# Patient Record
Sex: MALE | Race: WHITE | NOT HISPANIC OR LATINO | URBAN - METROPOLITAN AREA
[De-identification: names, ages, dates, MRNs, and addresses within clinical notes are randomized per-mention and may not be internally consistent; named-entity substitution may affect disease eponyms.]

---

## 2022-03-10 ENCOUNTER — INPATIENT (INPATIENT)
Facility: HOSPITAL | Age: 62
LOS: 0 days | Discharge: HOME | DRG: 310 | End: 2022-03-11
Attending: HOSPITALIST | Admitting: FAMILY MEDICINE
Payer: COMMERCIAL

## 2022-03-10 VITALS
HEIGHT: 70 IN | TEMPERATURE: 99 F | DIASTOLIC BLOOD PRESSURE: 94 MMHG | SYSTOLIC BLOOD PRESSURE: 146 MMHG | RESPIRATION RATE: 18 BRPM | HEART RATE: 164 BPM | WEIGHT: 220.9 LBS | OXYGEN SATURATION: 98 %

## 2022-03-10 DIAGNOSIS — I48.91 UNSPECIFIED ATRIAL FIBRILLATION: ICD-10-CM

## 2022-03-10 LAB
ALBUMIN SERPL ELPH-MCNC: 4.1 G/DL — SIGNIFICANT CHANGE UP (ref 3.3–5.2)
ALP SERPL-CCNC: 96 U/L — SIGNIFICANT CHANGE UP (ref 40–120)
ALT FLD-CCNC: 19 U/L — SIGNIFICANT CHANGE UP
ANION GAP SERPL CALC-SCNC: 20 MMOL/L — HIGH (ref 5–17)
APTT BLD: 27.1 SEC — LOW (ref 27.5–35.5)
AST SERPL-CCNC: 41 U/L — HIGH
BASOPHILS # BLD AUTO: 0.09 K/UL — SIGNIFICANT CHANGE UP (ref 0–0.2)
BASOPHILS NFR BLD AUTO: 1 % — SIGNIFICANT CHANGE UP (ref 0–2)
BILIRUB SERPL-MCNC: 0.9 MG/DL — SIGNIFICANT CHANGE UP (ref 0.4–2)
BUN SERPL-MCNC: 9 MG/DL — SIGNIFICANT CHANGE UP (ref 8–20)
CALCIUM SERPL-MCNC: 9 MG/DL — SIGNIFICANT CHANGE UP (ref 8.6–10.2)
CHLORIDE SERPL-SCNC: 97 MMOL/L — LOW (ref 98–107)
CO2 SERPL-SCNC: 25 MMOL/L — SIGNIFICANT CHANGE UP (ref 22–29)
CREAT SERPL-MCNC: 0.62 MG/DL — SIGNIFICANT CHANGE UP (ref 0.5–1.3)
EGFR: 109 ML/MIN/1.73M2 — SIGNIFICANT CHANGE UP
EOSINOPHIL # BLD AUTO: 0.02 K/UL — SIGNIFICANT CHANGE UP (ref 0–0.5)
EOSINOPHIL NFR BLD AUTO: 0.2 % — SIGNIFICANT CHANGE UP (ref 0–6)
FLUAV AG NPH QL: SIGNIFICANT CHANGE UP
FLUBV AG NPH QL: SIGNIFICANT CHANGE UP
GLUCOSE SERPL-MCNC: 140 MG/DL — HIGH (ref 70–99)
HCT VFR BLD CALC: 40 % — SIGNIFICANT CHANGE UP (ref 39–50)
HGB BLD-MCNC: 14.2 G/DL — SIGNIFICANT CHANGE UP (ref 13–17)
IMM GRANULOCYTES NFR BLD AUTO: 0.3 % — SIGNIFICANT CHANGE UP (ref 0–1.5)
INR BLD: 1.02 RATIO — SIGNIFICANT CHANGE UP (ref 0.88–1.16)
LYMPHOCYTES # BLD AUTO: 1.47 K/UL — SIGNIFICANT CHANGE UP (ref 1–3.3)
LYMPHOCYTES # BLD AUTO: 17.1 % — SIGNIFICANT CHANGE UP (ref 13–44)
MAGNESIUM SERPL-MCNC: 1.6 MG/DL — LOW (ref 1.8–2.6)
MCHC RBC-ENTMCNC: 35.5 GM/DL — SIGNIFICANT CHANGE UP (ref 32–36)
MCHC RBC-ENTMCNC: 36 PG — HIGH (ref 27–34)
MCV RBC AUTO: 101.5 FL — HIGH (ref 80–100)
MONOCYTES # BLD AUTO: 0.72 K/UL — SIGNIFICANT CHANGE UP (ref 0–0.9)
MONOCYTES NFR BLD AUTO: 8.4 % — SIGNIFICANT CHANGE UP (ref 2–14)
NEUTROPHILS # BLD AUTO: 6.29 K/UL — SIGNIFICANT CHANGE UP (ref 1.8–7.4)
NEUTROPHILS NFR BLD AUTO: 73 % — SIGNIFICANT CHANGE UP (ref 43–77)
NT-PROBNP SERPL-SCNC: 518 PG/ML — HIGH (ref 0–300)
PHOSPHATE SERPL-MCNC: 1.7 MG/DL — LOW (ref 2.4–4.7)
PLATELET # BLD AUTO: 240 K/UL — SIGNIFICANT CHANGE UP (ref 150–400)
POTASSIUM SERPL-MCNC: 2.8 MMOL/L — CRITICAL LOW (ref 3.5–5.3)
POTASSIUM SERPL-SCNC: 2.8 MMOL/L — CRITICAL LOW (ref 3.5–5.3)
PROT SERPL-MCNC: 6.8 G/DL — SIGNIFICANT CHANGE UP (ref 6.6–8.7)
PROTHROM AB SERPL-ACNC: 11.8 SEC — SIGNIFICANT CHANGE UP (ref 10.5–13.4)
RBC # BLD: 3.94 M/UL — LOW (ref 4.2–5.8)
RBC # FLD: 15.2 % — HIGH (ref 10.3–14.5)
RSV RNA NPH QL NAA+NON-PROBE: SIGNIFICANT CHANGE UP
SARS-COV-2 RNA SPEC QL NAA+PROBE: SIGNIFICANT CHANGE UP
SODIUM SERPL-SCNC: 142 MMOL/L — SIGNIFICANT CHANGE UP (ref 135–145)
TROPONIN T SERPL-MCNC: <0.01 NG/ML — SIGNIFICANT CHANGE UP (ref 0–0.06)
WBC # BLD: 8.62 K/UL — SIGNIFICANT CHANGE UP (ref 3.8–10.5)
WBC # FLD AUTO: 8.62 K/UL — SIGNIFICANT CHANGE UP (ref 3.8–10.5)

## 2022-03-10 PROCEDURE — 99291 CRITICAL CARE FIRST HOUR: CPT

## 2022-03-10 PROCEDURE — 99223 1ST HOSP IP/OBS HIGH 75: CPT

## 2022-03-10 PROCEDURE — 73130 X-RAY EXAM OF HAND: CPT | Mod: 26,LT

## 2022-03-10 PROCEDURE — 93306 TTE W/DOPPLER COMPLETE: CPT | Mod: 26

## 2022-03-10 PROCEDURE — 93010 ELECTROCARDIOGRAM REPORT: CPT

## 2022-03-10 PROCEDURE — 71045 X-RAY EXAM CHEST 1 VIEW: CPT | Mod: 26

## 2022-03-10 RX ORDER — SODIUM CHLORIDE 9 MG/ML
1000 INJECTION INTRAMUSCULAR; INTRAVENOUS; SUBCUTANEOUS ONCE
Refills: 0 | Status: COMPLETED | OUTPATIENT
Start: 2022-03-10 | End: 2022-03-10

## 2022-03-10 RX ORDER — ENOXAPARIN SODIUM 100 MG/ML
100 INJECTION SUBCUTANEOUS EVERY 12 HOURS
Refills: 0 | Status: DISCONTINUED | OUTPATIENT
Start: 2022-03-11 | End: 2022-03-11

## 2022-03-10 RX ORDER — ENOXAPARIN SODIUM 100 MG/ML
60 INJECTION SUBCUTANEOUS ONCE
Refills: 0 | Status: COMPLETED | OUTPATIENT
Start: 2022-03-10 | End: 2022-03-10

## 2022-03-10 RX ORDER — SODIUM,POTASSIUM PHOSPHATES 278-250MG
1 POWDER IN PACKET (EA) ORAL ONCE
Refills: 0 | Status: COMPLETED | OUTPATIENT
Start: 2022-03-10 | End: 2022-03-10

## 2022-03-10 RX ORDER — MIDAZOLAM HYDROCHLORIDE 1 MG/ML
2 INJECTION, SOLUTION INTRAMUSCULAR; INTRAVENOUS ONCE
Refills: 0 | Status: DISCONTINUED | OUTPATIENT
Start: 2022-03-10 | End: 2022-03-10

## 2022-03-10 RX ORDER — ENOXAPARIN SODIUM 100 MG/ML
40 INJECTION SUBCUTANEOUS ONCE
Refills: 0 | Status: COMPLETED | OUTPATIENT
Start: 2022-03-10 | End: 2022-03-10

## 2022-03-10 RX ORDER — METOPROLOL TARTRATE 50 MG
25 TABLET ORAL EVERY 8 HOURS
Refills: 0 | Status: DISCONTINUED | OUTPATIENT
Start: 2022-03-10 | End: 2022-03-11

## 2022-03-10 RX ORDER — DILTIAZEM HCL 120 MG
60 CAPSULE, EXT RELEASE 24 HR ORAL ONCE
Refills: 0 | Status: COMPLETED | OUTPATIENT
Start: 2022-03-10 | End: 2022-03-10

## 2022-03-10 RX ORDER — DILTIAZEM HCL 120 MG
10 CAPSULE, EXT RELEASE 24 HR ORAL ONCE
Refills: 0 | Status: COMPLETED | OUTPATIENT
Start: 2022-03-10 | End: 2022-03-10

## 2022-03-10 RX ORDER — DILTIAZEM HCL 120 MG
25 CAPSULE, EXT RELEASE 24 HR ORAL ONCE
Refills: 0 | Status: COMPLETED | OUTPATIENT
Start: 2022-03-10 | End: 2022-03-10

## 2022-03-10 RX ORDER — MAGNESIUM SULFATE 500 MG/ML
2 VIAL (ML) INJECTION ONCE
Refills: 0 | Status: COMPLETED | OUTPATIENT
Start: 2022-03-10 | End: 2022-03-10

## 2022-03-10 RX ORDER — POTASSIUM CHLORIDE 20 MEQ
20 PACKET (EA) ORAL ONCE
Refills: 0 | Status: COMPLETED | OUTPATIENT
Start: 2022-03-10 | End: 2022-03-10

## 2022-03-10 RX ORDER — POTASSIUM CHLORIDE 20 MEQ
10 PACKET (EA) ORAL
Refills: 0 | Status: COMPLETED | OUTPATIENT
Start: 2022-03-10 | End: 2022-03-10

## 2022-03-10 RX ADMIN — ENOXAPARIN SODIUM 60 MILLIGRAM(S): 100 INJECTION SUBCUTANEOUS at 17:00

## 2022-03-10 RX ADMIN — Medication 1 MILLIGRAM(S): at 22:24

## 2022-03-10 RX ADMIN — ENOXAPARIN SODIUM 40 MILLIGRAM(S): 100 INJECTION SUBCUTANEOUS at 16:04

## 2022-03-10 RX ADMIN — Medication 10 MILLIGRAM(S): at 16:02

## 2022-03-10 RX ADMIN — Medication 25 GRAM(S): at 17:42

## 2022-03-10 RX ADMIN — SODIUM CHLORIDE 1000 MILLILITER(S): 9 INJECTION INTRAMUSCULAR; INTRAVENOUS; SUBCUTANEOUS at 15:57

## 2022-03-10 RX ADMIN — SODIUM CHLORIDE 1000 MILLILITER(S): 9 INJECTION INTRAMUSCULAR; INTRAVENOUS; SUBCUTANEOUS at 17:25

## 2022-03-10 RX ADMIN — Medication 100 MILLIEQUIVALENT(S): at 22:09

## 2022-03-10 RX ADMIN — Medication 100 MILLIEQUIVALENT(S): at 18:24

## 2022-03-10 RX ADMIN — Medication 50 MILLIGRAM(S): at 22:07

## 2022-03-10 RX ADMIN — Medication 20 MILLIEQUIVALENT(S): at 17:00

## 2022-03-10 RX ADMIN — SODIUM CHLORIDE 1000 MILLILITER(S): 9 INJECTION INTRAMUSCULAR; INTRAVENOUS; SUBCUTANEOUS at 18:25

## 2022-03-10 RX ADMIN — Medication 25 MILLIGRAM(S): at 22:26

## 2022-03-10 RX ADMIN — Medication 25 MILLIGRAM(S): at 16:00

## 2022-03-10 RX ADMIN — Medication 60 MILLIGRAM(S): at 15:57

## 2022-03-10 RX ADMIN — SODIUM CHLORIDE 1000 MILLILITER(S): 9 INJECTION INTRAMUSCULAR; INTRAVENOUS; SUBCUTANEOUS at 18:15

## 2022-03-10 RX ADMIN — MIDAZOLAM HYDROCHLORIDE 2 MILLIGRAM(S): 1 INJECTION, SOLUTION INTRAMUSCULAR; INTRAVENOUS at 15:53

## 2022-03-10 RX ADMIN — Medication 1 PACKET(S): at 22:07

## 2022-03-10 NOTE — ED PROVIDER NOTE - CLINICAL SUMMARY MEDICAL DECISION MAKING FREE TEXT BOX
60 yo male presents with palpitations from physician's office. Found to be in AFIB with RVR. Will administer 25 mg Cardizem, Versed to quell the patient's anxiety, IVF. Cardiac workup. XR left hand. Monitor and reassess.

## 2022-03-10 NOTE — ED ADULT NURSE REASSESSMENT NOTE - NS ED NURSE REASSESS COMMENT FT1
new heplock inserted 20 gauge left hand patient transport to [facility] at this time

## 2022-03-10 NOTE — ED ADULT NURSE NOTE - CHIEF COMPLAINT QUOTE
pt sent by MD for AFib, states palpitations on & off most likely due to alcohol drinking written on papers with patient From Dr Skinner  A&Ox3, resp wnl, denies CP SOB at times, HX Afib -156

## 2022-03-10 NOTE — ED ADULT TRIAGE NOTE - CHIEF COMPLAINT QUOTE
pt sent by MD for AFib, states palpitations on & off  A&Ox3, resp wnl, denies CP SOB at times, HX Afib -156 pt sent by MD for AFib, states palpitations on & off most likely due to alcohol drinking written on papers with patient From Dr Skinner  A&Ox3, resp wnl, denies CP SOB at times, HX Afib -156

## 2022-03-10 NOTE — ED PROVIDER NOTE - PHYSICAL EXAMINATION
Gen: NAD  Head: NC/AT  Neck: trachea midline  Resp:  No distress, lungs cta b/l  Cardiac: Irregularly irregular rate and rhythm. No LE edema.   Ext: no deformities. Left hand ttp.  Neuro:  A&O appears non focal  Skin:  Warm and dry as visualized  Psych:  Anxious appearing.

## 2022-03-10 NOTE — ED ADULT NURSE REASSESSMENT NOTE - NS ED NURSE REASSESS COMMENT FT1
endorsed received from [place] doe patient presented for palpitations patient denies chest pain at this time cardiac monitor show hr 133 patient seen and evaluated by md

## 2022-03-10 NOTE — CONSULT NOTE ADULT - SUBJECTIVE AND OBJECTIVE BOX
[facility]                                                               Office:  [address] [address]                                                              Telephone: [phone]. Fax:[phone]                                                                        CARDIOLOGY CONSULTATION NOTE                                                                                             Consult requested by:  Dr. Agee   Reason for Consultation: Afib paroxysmal   History obtained by: Patient and medical record   obtained: No    Chief complaint:    Patient is a 61y old  Male who presents with a chief complaint of       HPI: 60 y/o with PMH of pAF (previously in [place] year ago was on AC and then transitioned to ASA) presented to the PMD with bilateral lower extremity and upper extremity arm pain and was noted to be in Afib and referred to the hospital. Patient notes that hie was told that his heart rate was elevated and so presented to the ER. He notes that the last time he was diagnosed with AF he was started on AC and had no cardioversions and then transitioned to Asa 81mg po q daily which he states he isn't taking at this time He denies nay palpitations dizziness, lightheadedness, and or syncope further denies any chest pain or shortness of breath at rest or upon exertion.   Patient complains of bilateral lower extremity pain and LUE numbness and pain.   No previous cardiac work up       REVIEW OF SYMPTOMS:     CONSTITUTIONAL: No fever, weight loss, or fatigue  ENMT:  No difficulty hearing, tinnitus, vertigo; No sinus or throat pain  NECK: No pain or stiffness  CARDIOVASCULAR: No chest pain, dyspnea, syncope, palpitations, dizziness, Orthopnea, Paroxsymal nocturnal dyspnea  RESPIRATORY: No Dyspnea on exertion, Shortness of breath, cough, wheezing  : No dysuria, no hematuria   GI: No dark color stool, no melena, no diarrhea, no constipation, no abdominal pain   NEURO: No headache, no dizziness, no slurred speech   MUSCULOSKELETAL: + joint pain; No muscle, back, or extremity pain  PSYCH: No agitation, no anxiety.    ALL OTHER REVIEW OF SYSTEMS ARE NEGATIVE.      PREVIOUS DIAGNOSTIC TESTING  ECHO FINDINGS:  Pending     STRESS FINDINGS:      CATHETERIZATION FINDINGS:         ALLERGIES: Allergies    No Known Allergies    Intolerances          PAST MEDICAL HISTORY  Afib        PAST SURGICAL HISTORY      FAMILY HISTORY:      SOCIAL HISTORY:  Denies smoking/alcohol/drugs  CIGARETTES:     ALCOHOL:  DRUGS:      CURRENT MEDICATIONS:           HOME MEDICATIONS:      Vital Signs Last 24 Hrs  T(C): 37 (10 Mar 2022 15:05), Max: 37 (10 Mar 2022 15:05)  T(F): 98.6 (10 Mar 2022 15:05), Max: 98.6 (10 Mar 2022 15:05)  HR: 103 (10 Mar 2022 16:08) (103 - 164)  BP: 102/72 (10 Mar 2022 16:08) (95/65 - 146/94)  BP(mean): --  RR: 16 (10 Mar 2022 16:08) (16 - 18)  SpO2: 99% (10 Mar 2022 16:08) (98% - 99%)      PHYSICAL EXAM:  Constitutional: Comfortable . No acute distress.   HEENT: Atraumatic and normocephalic , neck is supple . no JVD. No carotid bruit. PEERL   CNS: A&Ox3. No focal deficits. EOMI. Cranial nerves II-IX are intact.   Lymph Nodes: Cervical : Not palpable.  Respiratory: CTAB  Cardiovascular: S1S2 RRR. No murmur/rubs or gallop.  Gastrointestinal: Soft non-tender and non distended . +Bowel sounds. negative Gleason's sign.  Extremities: No edema. + 2 DP and PT palpated   Psychiatric: Calm . no agitation.  Skin: No skin rash/ulcers visualized to face, hands or feet.    Intake and output:     LABS:                        14.2   8.62  )-----------( 240      ( 10 Mar 2022 16:13 )             40.0             ;p-BNP=  PT/INR - ( 10 Mar 2022 16:13 )   PT: 11.8 sec;   INR: 1.02 ratio         PTT - ( 10 Mar 2022 16:13 )  PTT:27.1 sec      INTERPRETATION OF TELEMETRY: Reviewed by me. Afib with RVR   ECG: Reviewed by me. Afib with RVR @ 147 bpm     RADIOLOGY & ADDITIONAL STUDIES:    X-ray:  reviewed by me.   CT scan:   MRI:

## 2022-03-10 NOTE — H&P ADULT - NSHPSOCIALHISTORY_GEN_ALL_CORE
drinks 1/2 to 1 pint vodka daily, smokes 12-14 cig daily for past several years. has used cocaine many years ago as per pt.

## 2022-03-10 NOTE — ED PROVIDER NOTE - ATTENDING CONTRIBUTION TO CARE
I, Omi Burnett, performed a face to face bedside interview with this patient regarding history of present illness, review of symptoms and relevant past medical, social and family history.  I completed an independent physical examination. I have communicated the patient’s plan of care and disposition with the resident.  61 year old male presents with palpitations, found to have rapid afib, requiring immediate intervention with IV cardioactive medications for potentially life threatening etiology.  Gen: NAD, well appearing  CV: Rapid and irregular  Pul: CTA b/l  Abd: Soft, non-distended, non-tender  Neuro: no focal deficits  Pt admitted for rapid afib for cardioversion

## 2022-03-10 NOTE — H&P ADULT - NSHPPHYSICALEXAM_GEN_ALL_CORE
Vital Signs Last 24 Hrs  T(C): 37 (10 Mar 2022 15:05), Max: 37 (10 Mar 2022 15:05)  T(F): 98.6 (10 Mar 2022 15:05), Max: 98.6 (10 Mar 2022 15:05)  HR: 103 (10 Mar 2022 16:08) (103 - 164)  BP: 102/72 (10 Mar 2022 16:08) (95/65 - 146/94)  BP(mean): --  RR: 16 (10 Mar 2022 16:08) (16 - 18)  SpO2: 99% (10 Mar 2022 16:08) (98% - 99%)    General: pt. in bed not in distress.  HEENT: AT, NC. PERRL. intact EOM. no throat erythema or exudate.   Neck: supple. no JVD.   Chest: CTA bilaterally, no w /r/r.   Heart: S1,S2. IRRR. no heart murmur. no edema.   Abdomen: soft. non-tender. non-distended. + BS.   Ext: no calf tenderness, ROM of all ext. intact.   vascular : distal pulses 2 + b/L.  Neuro: AAO x3. no focal weakness. no speech disorder, cns ii to xii intact.  Skin: warm and dry, left thumb anteriorly with a small dry appearing cut, no signs of infection, no pallor. mild facial flush.  lymphatic system : no lymphadenopathy  psych : mildly anxious but no si/hi.

## 2022-03-10 NOTE — CONSULT NOTE ADULT - ASSESSMENT
62 y/o with PMH of pAF (previously in [place] year ago was on AC and then transitioned to ASA) presented to the PMD with bilateral lower extremity and upper extremity arm pain and was noted to be in Afib and referred to the hospital.     1) Paroxysmal Atrial fibrillation (CHADS-VASc: 0)   - patient denies any chest pain or shortness of breath and or palpitations   - Telemetry monitor reviewed and in Afib (s/p Cardizem)   - will start Lopressor 25mg po q 8hrs and continue with telemetry monitoring and blood pressure monitoring   - start Lovenox 100mg SQ Q12hrs   - check TSH Lipid Panel and Hgb A1C  - will refer to EP for possible ELISE/DCCV tomorrow   - check TTE to assess LV function and valvulopathy     2) Electrolyte abnormalities hypokalemia and hypomagnesemia   - replete electrolytes and recheck     Ayana Mott D.O. [facility]  Cardiology/Vascular Cardiology -[facility]   Telephone # [phone]

## 2022-03-10 NOTE — ED PROVIDER NOTE - OBJECTIVE STATEMENT
62 yo male with possible PMHx of AFIB presents with palpitations, in AFIB from PCP office. Patient states that he has a remote history of AFIB back when he lived in the [facility] [place] [facility]-he has not taken anti-HTN medications or rate control medications in "years". Patient does not take any blood thinners, he takes Aspirin daily and this is the only medication he takes. He states that he is not experiencing any palpitations, cp, sob, dizziness. Denies any recent fever/chills, cough, sore throat, abd pain, n/v/d, urinary complaints, focal weakness/numbness/tingling in extremities. 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

## 2022-03-10 NOTE — H&P ADULT - ASSESSMENT
pt. is a 60 y/o male with PMH of pAF (previously in [place] year ago was on coumadin and then transitioned to ASA which he does not take regularly but took one today ) presented to the PMD with bilateral lower extremity and upper extremity arm pain and tingling which is chronic ( used to be on gabapentin  but not using anymore ) and was noted to be in Afib and referred to the hospital. pt. stated that last time he was diagnosed with AF he was started on AC and had no cardioversions. He denies any cp, sob, palpitations dizziness, lightheadedness,  syncope or near syncope. no abd. pain, n/v/d reported. reported left thumb had some cut but that has dried out. no fever. no chills.  pt. admits to drinking about 1/2 to 1 pint of vodka daily, last use was yesterday. as per pt. he is not on any prescribed meds at this point.    - new afib with rvr. admit to tele, serial trop, echo, po lopressor, trop negative x 1, tsh normal range, full dose lovenox, potential cardioversion in am as per EP.     - hypokalemia, k replaced follow repeat.    - hypomagnesemia, mag replaced as well.     - etoh abuse, pt needs to cut down and then avoid etoh use, etoh use might have contributed to afib triger. will keep on ciwa protocol.     - tobacco abuse. smoking cessation, pt. will let us know if decides to use nicotine patch.

## 2022-03-10 NOTE — H&P ADULT - NSHPLANGLIMITEDENGLISH_GEN_A_CORE
Airway    Date/Time: 4/2/2021 11:40 AM  Performed by: Mele Kemp CRNA  Authorized by: Harry Mejía MD     Final Airway Type:  Endotracheal airway  Final Endotracheal Airway*:  ETT  ETT Size (mm)*:  7.5  Cuff*:  Regular  Technique Used for Successful ETT Placement:  Direct laryngoscopy  Devices/Methods Used in Placement*:  Mask, Bag Valve, Oral ETT and Stylet  Intubation Procedure*:  Preoxygenation, ETCO2, Atraumatic, Dentition Unchanged, Pharynx Clear and Rapid Sequence Intubation  Insertion Site:  Oral  Blade Type*:  MAC  Blade Size*:  3  Cuff Volume (mL):  7  Measured from*:  Lips  Secured at (cm)*:  22  Placement Verified by: auscultation, capnometry and equal breath sounds    Glottic View*:  1 - full view of glottis  Attempts*:  1  Ventilation Between Attempts:  None  Number of Other Approaches Attempted:  0   Patient Identified, Procedure confirmed, Emergency equipment available and Safety protocols followed  Location:  OR  Urgency:  Elective  Difficult Airway: No    Indications for Airway Management:  Anesthesia and airway protection  Spontaneous Ventilation: absent    Sedation Level:  Anesthetized  MILS Maintained Throughout: No    Mask Difficulty Assessment:  0 - not attempted  Performed By:  CRNA  CRNA:  Mele Kemp CRNA  Start Time: 4/2/2021 11:40 AM         No

## 2022-03-10 NOTE — CONSULT NOTE ADULT - SUBJECTIVE AND OBJECTIVE BOX
61 year old male patient, current smoker, with a known history of severe anxiety, depression, arthritis, chronic back pain, ETOH use, hx of cocaine use, and atrial fibrillation which was diagnosed around 4 years ago when he resided in the [facility]. He suffers from severe anxiety and self treats himself with alcohol. He works currently lifting heavy barrels and has noticed over the last 6 months increasing episodes of fatigue and malaise. He reports the onset of this has been rather indolent and does admit to palpitations on occasion with severe anxiety events. He admits to neuropathy of both his feet and left arm which he thinks is due to sciatica and overuse. He went to a new PMD (Dr. Skinner). When he performed an EKG he was found to be in rapid AFib and was referred to the ED    He currently denies any associated chest pain, or palpitations. Denies and recent fever or chills. No changes in weight.     PAST MEDICAL & SURGICAL HISTORY:  Afib    REVIEW OF SYSTEMS  General: - fever of chills, + fatigue  Skin/Breast: - rashes  Ophthalmologic: - blurred vision  ENMT: - sore throat  Respiratory and Thorax: - cough or dyspnea  Cardiovascular: + KUMAR, - chest pain or palpitations  Gastrointestinal:	- N/V/D/C  Genitourinary: - dysuria  Musculoskeletal:	 + back pain with arthritis  Neurological: +weaknesses bilateral legs  Psychiatric: +anxiety and depression     MEDICATIONS  (STANDING):  magnesium sulfate  IVPB 2 Gram(s) IV Intermittent Once  metoprolol tartrate 25 milliGRAM(s) Oral every 8 hours  potassium chloride  10 mEq/100 mL IVPB 10 milliEquivalent(s) IV Intermittent every 1 hour    MEDICATIONS  (PRN):    Allergies  No Known Allergies    SOCIAL HISTORY: current smoker. 1ppd x 20 years. Current drinker 1-2 pints Vodka daily for "years" Former cocaine user for many years but stopped some time ago    FAMILY HISTORY: No family hx of arrhythmias or CAD.     Vital Signs Last 24 Hrs  T(C): 37 (10 Mar 2022 15:05), Max: 37 (10 Mar 2022 15:05)  T(F): 98.6 (10 Mar 2022 15:05), Max: 98.6 (10 Mar 2022 15:05)  HR: 103 (10 Mar 2022 16:08) (103 - 164)  BP: 102/72 (10 Mar 2022 16:08) (95/65 - 146/94)  RR: 16 (10 Mar 2022 16:08) (16 - 18)  SpO2: 99% (10 Mar 2022 16:08) (98% - 99%)    Physical Exam:  Constitutional: AAOx3, NAD  Neck: supple, No JVD  Cardiovascular: +S1S2 IRIR, AFib at time of exam  Pulmonary: CTA b/l, unlabored, no wheezes, rales. rhonci  Abdomen: +BS, soft NTND  Extremities: no edema b/l,   Neuro: non focal, speech clear, MONDRAGON x 4    LABS:                        14.2   8.62  )-----------( 240      ( 10 Mar 2022 16:13 )             40.0     142  |  97<L>  |  9.0  ----------------------------<  140<H>  2.8<LL>   |  25.0  |  0.62  Ca    9.0      10 Mar 2022 16:13  Phos  1.7     03-10  Mg     1.6     03-10  TPro  6.8  /  Alb  4.1  /  TBili  0.9  /  DBili  x   /  AST  41<H>  /  ALT  19  /  AlkPhos  96  03-10  LIVER FUNCTIONS - ( 10 Mar 2022 16:13 )  Alb: 4.1 g/dL / Pro: 6.8 g/dL / ALK PHOS: 96 U/L / ALT: 19 U/L / AST: 41 U/L / GGT: x         PT/INR - ( 10 Mar 2022 16:13 )   PT: 11.8 sec;   INR: 1.02 ratio    PTT - ( 10 Mar 2022 16:13 )  PTT:27.1 secCARDIAC MARKERS ( 10 Mar 2022 16:13 )  x     / <0.01 ng/mL / x     / x     / x        RADIOLOGY & ADDITIONAL STUDIES:  CXR 3/10/22  COMPARISON: None available.  Heart is enlarged. Lungs are clear.  IMPRESSION: No acute finding. Heart enlargement.    A/P   61 year old male patient, current smoker, with a known history of severe anxiety, depression, arthritis, chronic back pain, ETOH use, hx of cocaine use, and atrial fibrillation which was diagnosed around 4 years ago when he resided in the [facility]. He suffers from severe anxiety and self treats himself with alcohol. He works currently lifting heavy barrels and has noticed over the last 6 months increasing episodes of fatigue and malaise. He reports the onset of this has been rather indolent and does admit to palpitations on occasion with severe anxiety events. He admits to neuropathy of both his feet and left arm which he thinks is due to sciatica and overuse. He went to a new PMD (Dr. Skinner). When he performed an EKG he was found to be in rapid AFib and was referred to the ED    He currently denies any associated chest pain, or palpitations. Denies and recent fever or chills. No changes in weight.     PAST MEDICAL & SURGICAL HISTORY:  Afib    REVIEW OF SYSTEMS  General: - fever of chills, + fatigue  Skin/Breast: - rashes  Ophthalmologic: - blurred vision  ENMT: - sore throat  Respiratory and Thorax: - cough or dyspnea  Cardiovascular: + KUMAR, - chest pain or palpitations  Gastrointestinal:	- N/V/D/C  Genitourinary: - dysuria  Musculoskeletal:	 + back pain with arthritis  Neurological: +weaknesses bilateral legs  Psychiatric: +anxiety and depression     MEDICATIONS  (STANDING):  magnesium sulfate  IVPB 2 Gram(s) IV Intermittent Once  metoprolol tartrate 25 milliGRAM(s) Oral every 8 hours  potassium chloride  10 mEq/100 mL IVPB 10 milliEquivalent(s) IV Intermittent every 1 hour    MEDICATIONS  (PRN):    Allergies  No Known Allergies    SOCIAL HISTORY: current smoker. 1ppd x 20 years. Current drinker 1-2 pints Vodka daily for "years" Former cocaine user for many years but stopped some time ago    FAMILY HISTORY: No family hx of arrhythmias or CAD.     Vital Signs Last 24 Hrs  T(C): 37 (10 Mar 2022 15:05), Max: 37 (10 Mar 2022 15:05)  T(F): 98.6 (10 Mar 2022 15:05), Max: 98.6 (10 Mar 2022 15:05)  HR: 103 (10 Mar 2022 16:08) (103 - 164)  BP: 102/72 (10 Mar 2022 16:08) (95/65 - 146/94)  RR: 16 (10 Mar 2022 16:08) (16 - 18)  SpO2: 99% (10 Mar 2022 16:08) (98% - 99%)    Physical Exam:  Constitutional: AAOx3, NAD  Neck: supple, No JVD  Cardiovascular: +S1S2 IRIR, AFib at time of exam  Pulmonary: CTA b/l, unlabored, no wheezes, rales. rhonci  Abdomen: +BS, soft NTND  Extremities: no edema b/l,   Neuro: non focal, speech clear, OMNDRAGON x 4    LABS:                        14.2   8.62  )-----------( 240      ( 10 Mar 2022 16:13 )             40.0     142  |  97<L>  |  9.0  ----------------------------<  140<H>  2.8<LL>   |  25.0  |  0.62  Ca    9.0      10 Mar 2022 16:13  Phos  1.7     03-10  Mg     1.6     03-10  TPro  6.8  /  Alb  4.1  /  TBili  0.9  /  DBili  x   /  AST  41<H>  /  ALT  19  /  AlkPhos  96  03-10  LIVER FUNCTIONS - ( 10 Mar 2022 16:13 )  Alb: 4.1 g/dL / Pro: 6.8 g/dL / ALK PHOS: 96 U/L / ALT: 19 U/L / AST: 41 U/L / GGT: x         PT/INR - ( 10 Mar 2022 16:13 )   PT: 11.8 sec;   INR: 1.02 ratio    PTT - ( 10 Mar 2022 16:13 )  PTT:27.1 secCARDIAC MARKERS ( 10 Mar 2022 16:13 )  x     / <0.01 ng/mL / x     / x     / x        RADIOLOGY & ADDITIONAL STUDIES:  CXR 3/10/22  COMPARISON: None available.  Heart is enlarged. Lungs are clear.  IMPRESSION: No acute finding. Heart enlargement.    EKG: Afib at 147bpm; QRSD 88ms    A/P  61 year old male patient, current smoker, with a known history of severe anxiety, depression, arthritis, chronic back pain, ETOH use, hx of cocaine use who presents with rapid atrial fibrillation. CHADSVASC: 0     - TSH normal   - TTE  - CIWA protocol   - Cardiology consult appreciated  - Rate control with beta blockers for now - unclear if he has CMP  - Remain NPO for potential ELISE/DCCV tomorrow  - Will need EP follow up as outpatient to discuss future rhythm control strategies.    61 year old male patient, current smoker, with a known history of severe anxiety, depression, arthritis, chronic back pain, ETOH use, hx of cocaine use, and atrial fibrillation which was diagnosed around 4 years ago when he resided in the [facility]. He suffers from severe anxiety and self treats himself with alcohol. He works currently lifting heavy barrels and has noticed over the last 6 months increasing episodes of fatigue and malaise. He reports the onset of this has been rather indolent and does admit to palpitations on occasion with severe anxiety events. He admits to neuropathy of both his feet and left arm which he thinks is due to sciatica and overuse. He went to a new PMD (Dr. Skinner). When he performed an EKG he was found to be in rapid AFib and was referred to the ED    He currently denies any associated chest pain, or palpitations. Denies and recent fever or chills. No changes in weight.     PAST MEDICAL & SURGICAL HISTORY:  Afib    REVIEW OF SYSTEMS  General: - fever of chills, + fatigue  Skin/Breast: - rashes  Ophthalmologic: - blurred vision  ENMT: - sore throat  Respiratory and Thorax: - cough or dyspnea  Cardiovascular: + KUMAR, - chest pain or palpitations  Gastrointestinal:	- N/V/D/C  Genitourinary: - dysuria  Musculoskeletal:	 + back pain with arthritis  Neurological: +weaknesses bilateral legs  Psychiatric: +anxiety and depression     MEDICATIONS  (STANDING):  magnesium sulfate  IVPB 2 Gram(s) IV Intermittent Once  metoprolol tartrate 25 milliGRAM(s) Oral every 8 hours  potassium chloride  10 mEq/100 mL IVPB 10 milliEquivalent(s) IV Intermittent every 1 hour    MEDICATIONS  (PRN):    Allergies  No Known Allergies    SOCIAL HISTORY: current smoker. 1ppd x 20 years. Current drinker 1-2 pints Vodka daily for "years" Former cocaine user for many years but stopped some time ago    FAMILY HISTORY: No family hx of arrhythmias or CAD.     Vital Signs Last 24 Hrs  T(C): 37 (10 Mar 2022 15:05), Max: 37 (10 Mar 2022 15:05)  T(F): 98.6 (10 Mar 2022 15:05), Max: 98.6 (10 Mar 2022 15:05)  HR: 103 (10 Mar 2022 16:08) (103 - 164)  BP: 102/72 (10 Mar 2022 16:08) (95/65 - 146/94)  RR: 16 (10 Mar 2022 16:08) (16 - 18)  SpO2: 99% (10 Mar 2022 16:08) (98% - 99%)    Physical Exam:  Constitutional: AAOx3, NAD  Neck: supple, No JVD  Cardiovascular: +S1S2 IRIR, AFib at time of exam  Pulmonary: CTA b/l, unlabored, no wheezes, rales. rhonci  Abdomen: +BS, soft NTND  Extremities: no edema b/l,   Neuro: non focal, speech clear, MONDRAGON x 4    LABS:                        14.2   8.62  )-----------( 240      ( 10 Mar 2022 16:13 )             40.0     142  |  97<L>  |  9.0  ----------------------------<  140<H>  2.8<LL>   |  25.0  |  0.62  Ca    9.0      10 Mar 2022 16:13  Phos  1.7     03-10  Mg     1.6     03-10  TPro  6.8  /  Alb  4.1  /  TBili  0.9  /  DBili  x   /  AST  41<H>  /  ALT  19  /  AlkPhos  96  03-10  LIVER FUNCTIONS - ( 10 Mar 2022 16:13 )  Alb: 4.1 g/dL / Pro: 6.8 g/dL / ALK PHOS: 96 U/L / ALT: 19 U/L / AST: 41 U/L / GGT: x         PT/INR - ( 10 Mar 2022 16:13 )   PT: 11.8 sec;   INR: 1.02 ratio    PTT - ( 10 Mar 2022 16:13 )  PTT:27.1 secCARDIAC MARKERS ( 10 Mar 2022 16:13 )  x     / <0.01 ng/mL / x     / x     / x        RADIOLOGY & ADDITIONAL STUDIES:  CXR 3/10/22  COMPARISON: None available.  Heart is enlarged. Lungs are clear.  IMPRESSION: No acute finding. Heart enlargement.    EKG: Afib at 147bpm; QRSD 88ms    A/P  61 year old male patient, current smoker, with a known history of severe anxiety, depression, arthritis, chronic back pain, ETOH use, hx of cocaine use who presents with rapid atrial fibrillation. CHADSVASC: 0     - TSH normal   - TTE  - CIWA protocol   - Cardiology consult appreciated  - Rate control with beta blockers for now - unclear if he has CMP  - Anticoagulate with Lovenox 100mg BID  - Remain NPO for potential ELISE/DCCV tomorrow  - Will need EP follow up as outpatient to discuss future rhythm control strategies.

## 2022-03-10 NOTE — H&P ADULT - HISTORY OF PRESENT ILLNESS
pt. is a 62 y/o male with PMH of pAF (previously in [place] year ago was on coumadin and then transitioned to ASA which he does not take regularly but took one today ) presented to the PMD with bilateral lower extremity and upper extremity arm pain and tingling which is chronic ( used to be on gabapentin  but not using anymore ) and was noted to be in Afib and referred to the hospital. pt. stated that last time he was diagnosed with AF he was started on AC and had no cardioversions. He denies any cp, sob, palpitations dizziness, lightheadedness,  syncope or near syncope. no abd. pain, n/v/d reported. pt. admits to drinking about 1/2 to 1 pint of vodka daily, last use was yesterday.  pt. is a 62 y/o male with PMH of pAF (previously in [place] year ago was on coumadin and then transitioned to ASA which he does not take regularly but took one today ) presented to the PMD with bilateral lower extremity and upper extremity arm pain and tingling which is chronic ( used to be on gabapentin  but not using anymore ) and was noted to be in Afib and referred to the hospital. pt. stated that last time he was diagnosed with AF he was started on AC and had no cardioversions. He denies any cp, sob, palpitations dizziness, lightheadedness,  syncope or near syncope. no abd. pain, n/v/d reported. reported left thumb had some cut but that has dried out. no fever. no chills.  pt. admits to drinking about 1/2 to 1 pint of vodka daily, last use was yesterday. as per pt. he is not on any prescribed meds at this point.
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
## 2022-03-10 NOTE — CONSULT NOTE ADULT - ATTENDING COMMENTS
Seen and examined, agree with above unless specified below  - AF with RVR, will plan ELISE-DCCV with at least one month of AC, NPO MN  - obesity, counseled about LSM.  - Mild LV dysfunction on prelim echo, EF 45-50%. ? alcohol vs AF vs ischemic vs mixed. Will r/a after final report. Will plan for rhythm control. Counseled about importance of alcohol cessation   - Hypokalemia, aggressive K and Mg replacement     above d/w pt  will follow up

## 2022-03-11 VITALS
RESPIRATION RATE: 20 BRPM | DIASTOLIC BLOOD PRESSURE: 92 MMHG | SYSTOLIC BLOOD PRESSURE: 162 MMHG | OXYGEN SATURATION: 98 % | HEART RATE: 119 BPM

## 2022-03-11 DIAGNOSIS — I48.91 UNSPECIFIED ATRIAL FIBRILLATION: ICD-10-CM

## 2022-03-11 DIAGNOSIS — E87.8 OTHER DISORDERS OF ELECTROLYTE AND FLUID BALANCE, NOT ELSEWHERE CLASSIFIED: ICD-10-CM

## 2022-03-11 LAB
ALBUMIN SERPL ELPH-MCNC: 3.7 G/DL — SIGNIFICANT CHANGE UP (ref 3.3–5.2)
ALP SERPL-CCNC: 75 U/L — SIGNIFICANT CHANGE UP (ref 40–120)
ALT FLD-CCNC: 16 U/L — SIGNIFICANT CHANGE UP
AMPHET UR-MCNC: NEGATIVE — SIGNIFICANT CHANGE UP
ANION GAP SERPL CALC-SCNC: 12 MMOL/L — SIGNIFICANT CHANGE UP (ref 5–17)
ANION GAP SERPL CALC-SCNC: 13 MMOL/L — SIGNIFICANT CHANGE UP (ref 5–17)
APPEARANCE UR: CLEAR — SIGNIFICANT CHANGE UP
AST SERPL-CCNC: 34 U/L — SIGNIFICANT CHANGE UP
BARBITURATES UR SCN-MCNC: NEGATIVE — SIGNIFICANT CHANGE UP
BASOPHILS # BLD AUTO: 0.08 K/UL — SIGNIFICANT CHANGE UP (ref 0–0.2)
BASOPHILS NFR BLD AUTO: 1.1 % — SIGNIFICANT CHANGE UP (ref 0–2)
BENZODIAZ UR-MCNC: POSITIVE
BILIRUB SERPL-MCNC: 0.8 MG/DL — SIGNIFICANT CHANGE UP (ref 0.4–2)
BILIRUB UR-MCNC: NEGATIVE — SIGNIFICANT CHANGE UP
BUN SERPL-MCNC: 5.8 MG/DL — LOW (ref 8–20)
BUN SERPL-MCNC: 8.8 MG/DL — SIGNIFICANT CHANGE UP (ref 8–20)
CALCIUM SERPL-MCNC: 8.1 MG/DL — LOW (ref 8.6–10.2)
CALCIUM SERPL-MCNC: 8.3 MG/DL — LOW (ref 8.6–10.2)
CHLORIDE SERPL-SCNC: 101 MMOL/L — SIGNIFICANT CHANGE UP (ref 98–107)
CHLORIDE SERPL-SCNC: 98 MMOL/L — SIGNIFICANT CHANGE UP (ref 98–107)
CHOLEST SERPL-MCNC: 168 MG/DL — SIGNIFICANT CHANGE UP
CO2 SERPL-SCNC: 28 MMOL/L — SIGNIFICANT CHANGE UP (ref 22–29)
CO2 SERPL-SCNC: 29 MMOL/L — SIGNIFICANT CHANGE UP (ref 22–29)
COCAINE METAB.OTHER UR-MCNC: NEGATIVE — SIGNIFICANT CHANGE UP
COLOR SPEC: YELLOW — SIGNIFICANT CHANGE UP
CREAT SERPL-MCNC: 0.52 MG/DL — SIGNIFICANT CHANGE UP (ref 0.5–1.3)
CREAT SERPL-MCNC: 0.65 MG/DL — SIGNIFICANT CHANGE UP (ref 0.5–1.3)
DIFF PNL FLD: NEGATIVE — SIGNIFICANT CHANGE UP
EGFR: 107 ML/MIN/1.73M2 — SIGNIFICANT CHANGE UP
EGFR: 115 ML/MIN/1.73M2 — SIGNIFICANT CHANGE UP
EOSINOPHIL # BLD AUTO: 0.06 K/UL — SIGNIFICANT CHANGE UP (ref 0–0.5)
EOSINOPHIL NFR BLD AUTO: 0.9 % — SIGNIFICANT CHANGE UP (ref 0–6)
GLUCOSE SERPL-MCNC: 113 MG/DL — HIGH (ref 70–99)
GLUCOSE SERPL-MCNC: 114 MG/DL — HIGH (ref 70–99)
GLUCOSE UR QL: NEGATIVE MG/DL — SIGNIFICANT CHANGE UP
HCT VFR BLD CALC: 34.2 % — LOW (ref 39–50)
HCV AB S/CO SERPL IA: 0.07 S/CO — SIGNIFICANT CHANGE UP (ref 0–0.99)
HCV AB SERPL-IMP: SIGNIFICANT CHANGE UP
HDLC SERPL-MCNC: 94 MG/DL — SIGNIFICANT CHANGE UP
HGB BLD-MCNC: 12.1 G/DL — LOW (ref 13–17)
IMM GRANULOCYTES NFR BLD AUTO: 0.3 % — SIGNIFICANT CHANGE UP (ref 0–1.5)
KETONES UR-MCNC: NEGATIVE — SIGNIFICANT CHANGE UP
LEUKOCYTE ESTERASE UR-ACNC: NEGATIVE — SIGNIFICANT CHANGE UP
LIPID PNL WITH DIRECT LDL SERPL: 31 MG/DL — SIGNIFICANT CHANGE UP
LYMPHOCYTES # BLD AUTO: 2.5 K/UL — SIGNIFICANT CHANGE UP (ref 1–3.3)
LYMPHOCYTES # BLD AUTO: 35.8 % — SIGNIFICANT CHANGE UP (ref 13–44)
MAGNESIUM SERPL-MCNC: 1.4 MG/DL — LOW (ref 1.6–2.6)
MAGNESIUM SERPL-MCNC: 1.5 MG/DL — LOW (ref 1.6–2.6)
MAGNESIUM SERPL-MCNC: 2.3 MG/DL — SIGNIFICANT CHANGE UP (ref 1.6–2.6)
MCHC RBC-ENTMCNC: 35.4 GM/DL — SIGNIFICANT CHANGE UP (ref 32–36)
MCHC RBC-ENTMCNC: 36.6 PG — HIGH (ref 27–34)
MCV RBC AUTO: 103.3 FL — HIGH (ref 80–100)
METHADONE UR-MCNC: NEGATIVE — SIGNIFICANT CHANGE UP
MONOCYTES # BLD AUTO: 0.57 K/UL — SIGNIFICANT CHANGE UP (ref 0–0.9)
MONOCYTES NFR BLD AUTO: 8.2 % — SIGNIFICANT CHANGE UP (ref 2–14)
NEUTROPHILS # BLD AUTO: 3.76 K/UL — SIGNIFICANT CHANGE UP (ref 1.8–7.4)
NEUTROPHILS NFR BLD AUTO: 53.7 % — SIGNIFICANT CHANGE UP (ref 43–77)
NITRITE UR-MCNC: NEGATIVE — SIGNIFICANT CHANGE UP
NON HDL CHOLESTEROL: 74 MG/DL — SIGNIFICANT CHANGE UP
OPIATES UR-MCNC: NEGATIVE — SIGNIFICANT CHANGE UP
PCP SPEC-MCNC: SIGNIFICANT CHANGE UP
PCP UR-MCNC: NEGATIVE — SIGNIFICANT CHANGE UP
PH UR: 6.5 — SIGNIFICANT CHANGE UP (ref 5–8)
PHOSPHATE SERPL-MCNC: 3.5 MG/DL — SIGNIFICANT CHANGE UP (ref 2.4–4.7)
PLATELET # BLD AUTO: 248 K/UL — SIGNIFICANT CHANGE UP (ref 150–400)
POTASSIUM SERPL-MCNC: 2.9 MMOL/L — CRITICAL LOW (ref 3.5–5.3)
POTASSIUM SERPL-MCNC: 3.2 MMOL/L — LOW (ref 3.5–5.3)
POTASSIUM SERPL-MCNC: 3.2 MMOL/L — LOW (ref 3.5–5.3)
POTASSIUM SERPL-SCNC: 2.9 MMOL/L — CRITICAL LOW (ref 3.5–5.3)
POTASSIUM SERPL-SCNC: 3.2 MMOL/L — LOW (ref 3.5–5.3)
POTASSIUM SERPL-SCNC: 3.2 MMOL/L — LOW (ref 3.5–5.3)
PROT SERPL-MCNC: 5.8 G/DL — LOW (ref 6.6–8.7)
PROT UR-MCNC: NEGATIVE — SIGNIFICANT CHANGE UP
RBC # BLD: 3.31 M/UL — LOW (ref 4.2–5.8)
RBC # FLD: 15.5 % — HIGH (ref 10.3–14.5)
SODIUM SERPL-SCNC: 140 MMOL/L — SIGNIFICANT CHANGE UP (ref 135–145)
SODIUM SERPL-SCNC: 140 MMOL/L — SIGNIFICANT CHANGE UP (ref 135–145)
SP GR SPEC: 1.01 — SIGNIFICANT CHANGE UP (ref 1.01–1.02)
THC UR QL: NEGATIVE — SIGNIFICANT CHANGE UP
TRIGL SERPL-MCNC: 216 MG/DL — HIGH
TROPONIN T SERPL-MCNC: <0.01 NG/ML — SIGNIFICANT CHANGE UP (ref 0–0.06)
UROBILINOGEN FLD QL: NEGATIVE MG/DL — SIGNIFICANT CHANGE UP
WBC # BLD: 6.99 K/UL — SIGNIFICANT CHANGE UP (ref 3.8–10.5)
WBC # FLD AUTO: 6.99 K/UL — SIGNIFICANT CHANGE UP (ref 3.8–10.5)

## 2022-03-11 PROCEDURE — 36415 COLL VENOUS BLD VENIPUNCTURE: CPT

## 2022-03-11 PROCEDURE — 83735 ASSAY OF MAGNESIUM: CPT

## 2022-03-11 PROCEDURE — 93325 DOPPLER ECHO COLOR FLOW MAPG: CPT

## 2022-03-11 PROCEDURE — 87637 SARSCOV2&INF A&B&RSV AMP PRB: CPT

## 2022-03-11 PROCEDURE — 93005 ELECTROCARDIOGRAM TRACING: CPT

## 2022-03-11 PROCEDURE — 93306 TTE W/DOPPLER COMPLETE: CPT

## 2022-03-11 PROCEDURE — 86803 HEPATITIS C AB TEST: CPT

## 2022-03-11 PROCEDURE — 96360 HYDRATION IV INFUSION INIT: CPT

## 2022-03-11 PROCEDURE — 96375 TX/PRO/DX INJ NEW DRUG ADDON: CPT

## 2022-03-11 PROCEDURE — 93312 ECHO TRANSESOPHAGEAL: CPT

## 2022-03-11 PROCEDURE — 80053 COMPREHEN METABOLIC PANEL: CPT

## 2022-03-11 PROCEDURE — 99233 SBSQ HOSP IP/OBS HIGH 50: CPT | Mod: 25

## 2022-03-11 PROCEDURE — 96374 THER/PROPH/DIAG INJ IV PUSH: CPT

## 2022-03-11 PROCEDURE — 80061 LIPID PANEL: CPT

## 2022-03-11 PROCEDURE — 84484 ASSAY OF TROPONIN QUANT: CPT

## 2022-03-11 PROCEDURE — 93312 ECHO TRANSESOPHAGEAL: CPT | Mod: 26

## 2022-03-11 PROCEDURE — 93010 ELECTROCARDIOGRAM REPORT: CPT

## 2022-03-11 PROCEDURE — 93320 DOPPLER ECHO COMPLETE: CPT | Mod: 26

## 2022-03-11 PROCEDURE — 85025 COMPLETE CBC W/AUTO DIFF WBC: CPT

## 2022-03-11 PROCEDURE — 93320 DOPPLER ECHO COMPLETE: CPT

## 2022-03-11 PROCEDURE — 84132 ASSAY OF SERUM POTASSIUM: CPT

## 2022-03-11 PROCEDURE — 85730 THROMBOPLASTIN TIME PARTIAL: CPT

## 2022-03-11 PROCEDURE — 81003 URINALYSIS AUTO W/O SCOPE: CPT

## 2022-03-11 PROCEDURE — 84443 ASSAY THYROID STIM HORMONE: CPT

## 2022-03-11 PROCEDURE — 80048 BASIC METABOLIC PNL TOTAL CA: CPT

## 2022-03-11 PROCEDURE — 83880 ASSAY OF NATRIURETIC PEPTIDE: CPT

## 2022-03-11 PROCEDURE — 99285 EMERGENCY DEPT VISIT HI MDM: CPT | Mod: 25

## 2022-03-11 PROCEDURE — 92960 CARDIOVERSION ELECTRIC EXT: CPT

## 2022-03-11 PROCEDURE — 80307 DRUG TEST PRSMV CHEM ANLYZR: CPT

## 2022-03-11 PROCEDURE — 93325 DOPPLER ECHO COLOR FLOW MAPG: CPT | Mod: 26

## 2022-03-11 PROCEDURE — 84100 ASSAY OF PHOSPHORUS: CPT

## 2022-03-11 PROCEDURE — 99233 SBSQ HOSP IP/OBS HIGH 50: CPT

## 2022-03-11 PROCEDURE — 73130 X-RAY EXAM OF HAND: CPT

## 2022-03-11 PROCEDURE — 71045 X-RAY EXAM CHEST 1 VIEW: CPT

## 2022-03-11 PROCEDURE — 85610 PROTHROMBIN TIME: CPT

## 2022-03-11 RX ORDER — POTASSIUM CHLORIDE 20 MEQ
10 PACKET (EA) ORAL ONCE
Refills: 0 | Status: COMPLETED | OUTPATIENT
Start: 2022-03-11 | End: 2022-03-11

## 2022-03-11 RX ORDER — METOPROLOL TARTRATE 50 MG
1 TABLET ORAL
Qty: 30 | Refills: 0
Start: 2022-03-11 | End: 2022-04-09

## 2022-03-11 RX ORDER — POTASSIUM CHLORIDE 20 MEQ
40 PACKET (EA) ORAL ONCE
Refills: 0 | Status: COMPLETED | OUTPATIENT
Start: 2022-03-11 | End: 2022-03-11

## 2022-03-11 RX ORDER — APIXABAN 2.5 MG/1
5 TABLET, FILM COATED ORAL EVERY 12 HOURS
Refills: 0 | Status: DISCONTINUED | OUTPATIENT
Start: 2022-03-11 | End: 2022-03-11

## 2022-03-11 RX ORDER — METOPROLOL TARTRATE 50 MG
5 TABLET ORAL EVERY 6 HOURS
Refills: 0 | Status: DISCONTINUED | OUTPATIENT
Start: 2022-03-11 | End: 2022-03-11

## 2022-03-11 RX ORDER — MAGNESIUM SULFATE 500 MG/ML
2 VIAL (ML) INJECTION ONCE
Refills: 0 | Status: COMPLETED | OUTPATIENT
Start: 2022-03-11 | End: 2022-03-11

## 2022-03-11 RX ORDER — POTASSIUM CHLORIDE 20 MEQ
20 PACKET (EA) ORAL ONCE
Refills: 0 | Status: DISCONTINUED | OUTPATIENT
Start: 2022-03-11 | End: 2022-03-11

## 2022-03-11 RX ORDER — APIXABAN 2.5 MG/1
1 TABLET, FILM COATED ORAL
Qty: 60 | Refills: 0
Start: 2022-03-11 | End: 2022-04-09

## 2022-03-11 RX ORDER — POTASSIUM CHLORIDE 20 MEQ
40 PACKET (EA) ORAL EVERY 4 HOURS
Refills: 0 | Status: DISCONTINUED | OUTPATIENT
Start: 2022-03-11 | End: 2022-03-11

## 2022-03-11 RX ORDER — METOPROLOL TARTRATE 50 MG
50 TABLET ORAL DAILY
Refills: 0 | Status: DISCONTINUED | OUTPATIENT
Start: 2022-03-11 | End: 2022-03-11

## 2022-03-11 RX ORDER — POTASSIUM CHLORIDE 20 MEQ
40 PACKET (EA) ORAL EVERY 4 HOURS
Refills: 0 | Status: COMPLETED | OUTPATIENT
Start: 2022-03-11 | End: 2022-03-11

## 2022-03-11 RX ORDER — CALCIUM GLUCONATE 100 MG/ML
2 VIAL (ML) INTRAVENOUS ONCE
Refills: 0 | Status: COMPLETED | OUTPATIENT
Start: 2022-03-11 | End: 2022-03-11

## 2022-03-11 RX ADMIN — Medication 40 MILLIEQUIVALENT(S): at 10:08

## 2022-03-11 RX ADMIN — Medication 5 MILLIGRAM(S): at 13:58

## 2022-03-11 RX ADMIN — Medication 40 MILLIEQUIVALENT(S): at 14:32

## 2022-03-11 RX ADMIN — Medication 100 MILLIEQUIVALENT(S): at 00:18

## 2022-03-11 RX ADMIN — Medication 25 MILLIGRAM(S): at 14:32

## 2022-03-11 RX ADMIN — Medication 40 MILLIEQUIVALENT(S): at 02:52

## 2022-03-11 RX ADMIN — Medication 25 GRAM(S): at 12:35

## 2022-03-11 RX ADMIN — Medication 200 GRAM(S): at 15:28

## 2022-03-11 RX ADMIN — Medication 25 MILLIGRAM(S): at 05:02

## 2022-03-11 RX ADMIN — ENOXAPARIN SODIUM 100 MILLIGRAM(S): 100 INJECTION SUBCUTANEOUS at 05:02

## 2022-03-11 RX ADMIN — Medication 100 MILLIEQUIVALENT(S): at 10:09

## 2022-03-11 RX ADMIN — Medication 50 MILLIGRAM(S): at 04:57

## 2022-03-11 RX ADMIN — Medication 50 MILLIGRAM(S): at 10:12

## 2022-03-11 RX ADMIN — Medication 25 GRAM(S): at 10:09

## 2022-03-11 NOTE — DISCHARGE NOTE PROVIDER - HOSPITAL COURSE
Routing refill request to provider for review/approval because:  Phq9=15  Fawn Malik RN          PATIENT LEFT AMA    60 y/o male with PMH of pAF (previously dx in [place] year ago was on coumadin and then transitioned to ASA which he does not take regularly) presented to the PMD with bilateral lower extremity and upper extremity arm pain and tingling which is chronic and was noted to be in Afib and referred to the hospital. In the ED he was seen by EP and admitted for cardioversion. Potassium and magnesium were low and replaced. He underwent successful cardioversion on 3/11. However his potassium remained low. He left the hospital against medical advise before I could speak to him. I called mr. Tian on the phone and explained the importance of compliance with Eliquis due to risk of stroke. He states he does not believe it. I also explained that price for eliquis needed to be verified that it was covered by his insuarnce. He did not want to wait.I expressed great and concern and sent rx for Eliquis and metoprolol to pharmacy. Patient said he would pick them up.

## 2022-03-11 NOTE — PROGRESS NOTE ADULT - ASSESSMENT
60 y/o with PMH of pAF (previously in [place] year ago was on AC and then transitioned to ASA) presented to the PMD with bilateral lower extremity and upper extremity arm pain and was noted to be in Afib and referred to the hospital.

## 2022-03-11 NOTE — PROGRESS NOTE ADULT - ATTENDING COMMENTS
Agree with above  s/p ELISE-DCCV, now in SR and feeling better. NO further inpatient EP tests or interventions. Counseled and exlained the importance of strict compliance with AC for at least one month  d/w Dr. Mott, ischemic evaluation will lijkely be defer to o/p  patient wants to go home, explained K is still low    Change metoprolol to Toprol 50 mg daily  start eliquis 5 mg bid starting tonight  follow up with me as o/p in 2-4 weeks  EPS will sign off, call us if needed
Patient was seen and examined post ELISE DCCV for Afib with RVR   Patient underwent DCCV at 360 J and converted to NSR   Plan is to start Toprol xl 50 mg po q daily and start Eliquis 5mg po q 12hrs tonight   if patient does not stay in hospital until Monday can defer any further ischemic eval in the outpatient setting and will see in the office in 1-2 weeks   discussed compliance with medications   replete electrolytes keep K ~4 and Mag ~2   will follow up tomorrow     yAana Mott D.O. [facility]  Cardiology/Vascular Cardiology -[facility]   Telephone # [phone]

## 2022-03-11 NOTE — PROGRESS NOTE ADULT - SUBJECTIVE AND OBJECTIVE BOX
Pt presents to cath holding room bed 1 in anticipation of ELISE guided cardioversion for new AFib w/RVR.  Telemetry -> AFib 116 bpm   Confirms NPO since midnight, except meds.   Received lovenox 100mg SQ @ 5AM, will plan to start oral AC after CV.

## 2022-03-11 NOTE — DISCHARGE NOTE PROVIDER - NSDCMRMEDTOKEN_GEN_ALL_CORE_FT
Eliquis 5 mg oral tablet: 1 tab(s) orally 2 times a day   Toprol-XL 50 mg oral tablet, extended release: 1 tab(s) orally once a day

## 2022-03-11 NOTE — DISCHARGE NOTE PROVIDER - PROVIDER TOKENS
PROVIDER:[TOKEN:[[id number]],FOLLOWUP:[2 weeks]],PROVIDER:[TOKEN:[[id number]],FOLLOWUP:[2 weeks]]

## 2022-03-11 NOTE — PROGRESS NOTE ADULT - ASSESSMENT
60 y/o male with PMH of pAF (previously dx in [place] year ago was on coumadin and then transitioned to ASA which he does not take regularly) presented to the PMD with bilateral lower extremity and upper extremity arm pain and tingling which is chronic and was noted to be in Afib and referred to the hospital. In the ED he was seen by EP and admitted for cardioversion.     Afib rvr  - npo for cardioversion today  - continue lopressor 25mg Q8hrs transition to toprol on dc  - DOAC at discharge, uniterrupted AC x 4 weeks  - EP and cards following  - IV metoprolol prn for hr >120 sustained    Hypokalemia and Hypomagnesemia  - replaced  - will recheck prior to procedure    ETOH abuse  - CIWA protocol   - educated on alcohol cessation  - Librium taper    Tobacco use  - educated on cessation    dvt PPx: Lovenox  Dispo: Possible dc today vs tomorrow pending cardioversion and DOAC rx

## 2022-03-11 NOTE — DISCHARGE NOTE PROVIDER - CARE PROVIDER_API CALL
Ayana Mott ()  Cardiology; Internal Medicine  [address] [place] [address]  Phone: [phone]  Fax: [phone]  Follow Up Time: 2 weeks    Alna Peoples)  Cardiac Electrophysiology; Cardiovascular Disease; Internal Medicine  [address] [place] [address]  Phone: [phone]  Fax: [phone]  Follow Up Time: 2 weeks

## 2022-03-11 NOTE — DISCHARGE NOTE PROVIDER - NSDCCPCAREPLAN_GEN_ALL_CORE_FT
PRINCIPAL DISCHARGE DIAGNOSIS  Diagnosis: Afib  Assessment and Plan of Treatment: s/p cardioversion  Continue Eliquis and metoprolol      SECONDARY DISCHARGE DIAGNOSES  Diagnosis: Hypokalemia  Assessment and Plan of Treatment: he left the hospital against medical advise ;prior to receiving supplementation    Diagnosis: Alcohol withdrawal  Assessment and Plan of Treatment: he left the hospital AMA prior to finishing his Librium taper

## 2022-03-11 NOTE — PROGRESS NOTE ADULT - SUBJECTIVE AND OBJECTIVE BOX
[facility] Division of Hospital Medicine    Chief Complaint:   new afib with rvr.    SUBJECTIVE / OVERNIGHT EVENTS: Patient seen and examined this morning. No complaints. Awaiting cardioversion. reports he drinks daily but does not get the shakes when he stops.     Patient denies chest pain, SOB, abd pain, N/V, fever, chills, dysuria or any other complaints. All remainder ROS negative.     MEDICATIONS  (STANDING):  calcium gluconate IVPB 2 Gram(s) IV Intermittent once  chlordiazePOXIDE   Oral   chlordiazePOXIDE 50 milliGRAM(s) Oral every 6 hours  chlordiazePOXIDE 50 milliGRAM(s) Oral every 8 hours  enoxaparin Injectable 100 milliGRAM(s) SubCutaneous every 12 hours  metoprolol tartrate 25 milliGRAM(s) Oral every 8 hours  potassium chloride    Tablet ER 40 milliEquivalent(s) Oral every 4 hours    MEDICATIONS  (PRN):  LORazepam   Injectable 2 milliGRAM(s) IV Push every 1 hour PRN CIWA-Ar score 8 or greater        I&O's Summary      PHYSICAL EXAM:  Vital Signs Last 24 Hrs  T(C): 36.8 (11 Mar 2022 11:36), Max: 37.4 (10 Mar 2022 23:50)  T(F): 98.3 (11 Mar 2022 11:36), Max: 99.3 (10 Mar 2022 23:50)  HR: 112 (11 Mar 2022 11:36) (103 - 164)  BP: 121/86 (11 Mar 2022 11:36) (95/65 - 158/78)  BP(mean): --  RR: 18 (11 Mar 2022 11:36) (16 - 18)  SpO2: 97% (11 Mar 2022 11:36) (95% - 99%)        CONSTITUTIONAL: NAD  ENMT: Moist oral mucosa, no pharyngeal injection or exudate  RESPIRATORY: Normal respiratory effort; lungs are clear to auscultation bilaterally  CARDIOVASCULAR: irregular rhythm, normal S1 and S2,No lower extremity edema;  ABDOMEN: Nontender to palpation, normoactive bowel sounds, no rebound/guarding  MUSCLOSKELETAL: no clubbing or cyanosis of digits; no joint swelling or tenderness to palpation  PSYCH: A+O to person, place, and time; affect appropriate  NEUROLOGY: CN 2-12 are intact and symmetric; no gross sensory deficits;   SKIN: No rashes; no palpable lesions    LABS:                        12.1   6.99  )-----------( 248      ( 11 Mar 2022 01:07 )             34.2     03-11    x   |  x   |  x   ----------------------------<  x   2.9<LL>   |  x   |  x     Ca    8.1<L>      11 Mar 2022 01:07  Phos  3.5     03-11  Mg     1.4     03-11    TPro  5.8<L>  /  Alb  3.7  /  TBili  0.8  /  DBili  x   /  AST  34  /  ALT  16  /  AlkPhos  75  03-11    PT/INR - ( 10 Mar 2022 16:13 )   PT: 11.8 sec;   INR: 1.02 ratio         PTT - ( 10 Mar 2022 16:13 )  PTT:27.1 sec  CARDIAC MARKERS ( 11 Mar 2022 01:07 )  x     / <0.01 ng/mL / x     / x     / x      CARDIAC MARKERS ( 10 Mar 2022 16:13 )  x     / <0.01 ng/mL / x     / x     / x          Urinalysis Basic - ( 11 Mar 2022 08:01 )    Color: Yellow / Appearance: Clear / S.010 / pH: x  Gluc: x / Ketone: Negative  / Bili: Negative / Urobili: Negative mg/dL   Blood: x / Protein: Negative / Nitrite: Negative   Leuk Esterase: Negative / RBC: x / WBC x   Sq Epi: x / Non Sq Epi: x / Bacteria: x        CAPILLARY BLOOD GLUCOSE            RADIOLOGY & ADDITIONAL TESTS:  Results Reviewed:   Imaging Personally Reviewed:  Electrocardiogram Personally Reviewed:

## 2022-03-11 NOTE — CHART NOTE - NSCHARTNOTEFT_GEN_A_CORE
PA NOTE-MEDICINE    Asked to Follow up on Repeat K Level s/p Repletion    03-11    140  |  98  |  8.8  ----------------------------<  113<H>  3.2<L>   |  29.0  |  0.65      RX'd Potassium chlor ER 40 Meq po x 1  Now   Repeat K ordered for 8 AM   Continue to monitor Pt  Call PA if any changes in pt status

## 2022-03-11 NOTE — PROGRESS NOTE ADULT - SUBJECTIVE AND OBJECTIVE BOX
[facility]                                                         CARDIOLOGY AT [facility]                                                                  [address] [place] [place] [address]                                                         Telephone: [phone]. Fax:[phone]                                                                             PROGRESS NOTE    Reason for follow up: AFib  Update: Afib rates   plan for ELISE/DCCV   pt c/o anxious this am, c/o diarrhea also still endorses numbness arms/feet.       Review of symptoms:   Cardiac:  No chest pain. No dyspnea. No palpitations.  Respiratory: no cough. No dyspnea  Gastrointestinal: No diarrhea. No abdominal pain. No bleeding.   Neuro: No focal neuro complaints.      Vitals:  T(C): 36.9 (03-11-22 @ 04:18), Max: 37.4 (03-10-22 @ 23:50)  HR: 115 (03-11-22 @ 04:18) (103 - 164)  BP: 138/92 (03-11-22 @ 04:18) (95/65 - 158/78)  RR: 18 (03-11-22 @ 04:18) (16 - 18)  SpO2: 95% (03-11-22 @ 04:18) (95% - 99%)      Weight (kg): 100.2 (03-10 @ 15:05)      PHYSICAL EXAM:  Appearance: Comfortable. No acute distress  HEENT:  Atraumatic. Normocephalic.  Normal oral mucosa  Neurologic: A & O x 3, no gross focal deficits.  Cardiovascular: RRR S1 S2, No murmur, no rubs/gallops. No JVD  Respiratory: Lungs clear to auscultation, unlabored   Gastrointestinal:  Soft, Non-tender, + BS  Lower Extremities: 2+ Peripheral Pulses, No clubbing, cyanosis, or edema  Psychiatry: Patient is calm. No agitation.   Skin: warm and dry.      CURRENT CARDIAC MEDICATIONS:  metoprolol tartrate 25 milliGRAM(s) Oral every 8 hours      CURRENT OTHER MEDICATIONS:  chlordiazePOXIDE   Oral   chlordiazePOXIDE 50 milliGRAM(s) Oral every 6 hours  chlordiazePOXIDE 50 milliGRAM(s) Oral every 8 hours  LORazepam   Injectable 2 milliGRAM(s) IV Push every 1 hour PRN CIWA-Ar score 8 or greater  calcium gluconate IVPB 2 Gram(s) IV Intermittent once, Stop order after: 1 Doses  enoxaparin Injectable 100 milliGRAM(s) SubCutaneous every 12 hours  potassium chloride    Tablet ER 40 milliEquivalent(s) Oral every 4 hours, Stop order after: 3 Doses      LABS:	 	  ( 11 Mar 2022 01:07 )  Troponin T  <0.01,  CPK  X    , CKMB  X    , BNP X      ( 10 Mar 2022 16:13 )  Troponin T  <0.01,  CPK  X    , CKMB  X    , <H>                          12.1   6.99  )-----------( 248      ( 11 Mar 2022 01:07 )             34.2     03-11    x   |  x   |  x   ----------------------------<  x   2.9<LL>   |  x   |  x     Ca    8.1<L>      11 Mar 2022 01:07  Phos  3.5     03-11  Mg     1.5     03-11    TPro  5.8<L>  /  Alb  3.7  /  TBili  0.8  /  DBili  x   /  AST  34  /  ALT  16  /  AlkPhos  75  03-11    PT/INR/PTT ( 10 Mar 2022 16:13 )                       :                       :      11.8         :       27.1                  .        .                   .              .           .       1.02        .                                       Lipid Profile: Date: 03-11 @ 01:07  Total cholesterol 168; Direct LDL: --; HDL: 94; Triglycerides:216    TSH: Thyroid Stimulating Hormone, Serum: 2.09 uIU/mL    TELEMETRY: Afib

## 2022-03-11 NOTE — PROGRESS NOTE ADULT - SUBJECTIVE AND OBJECTIVE BOX
Pt doing well s/p uncomplicated ELISE & DCCV, 360J x 1 with restoration of sinus rhythm. Denies complaint.     PAST MEDICAL & SURGICAL HISTORY:  Afib  Alcohol abuse  Tobacco abuse  No significant past surgical history    MEDICATIONS  (STANDING):  chlordiazePOXIDE   Oral   chlordiazePOXIDE 50 milliGRAM(s) Oral every 8 hours  enoxaparin Injectable 100 milliGRAM(s) SubCutaneous every 12 hours  metoprolol tartrate 25 milliGRAM(s) Oral every 8 hours    MEDICATIONS  (PRN):  LORazepam   Injectable 2 milliGRAM(s) IV Push every 1 hour PRN CIWA-Ar score 8 or greater  metoprolol tartrate Injectable 5 milliGRAM(s) IV Push every 6 hours PRN tachycardia    Post-procedure VS: /79 HR 81 O2 sat 96% RR 16   awake, alert, no obvious distress  Skin: no erythema/edema/blistering at defib pad sites.   Card: S1/S2, RRR, no m/g/r  Resp: lungs CTA b/l  Abd: S/NT/ND  Ext: no edema, distal pulses intact    TTE: 3/10/22: Summary:   1. Left ventricular ejection fraction, by visual estimation, is 45 to 50%.   2.Mildly decreased global left ventricular systolic function.   3. Basal inferoseptal segment and basal inferior segment are abnormal as described above.   4. Moderately enlarged left atrium.   5. Moderate to severe right atrial enlargement.   6. Trace mitral valve regurgitation.   7. Mild aortic regurgitation.   8. Sclerotic aortic valve with normal opening.   9. There is no evidence of pericardial effusion.  10. There is dilation of the ascending aorta to 4.8 cm, or 2.2 cm/m2 when indexed to patient's BSA, measured 5.8 cm above the aortic valve.  11. No prior studies are available for comparison.  Kathi Steele M.D. Electronically signed on 3/11/2022 at 10:00:42 AM    EKG: sinus rhythm 80 bpm, PVC    Assessment:   61 year old male patient, current smoker, with a known history of severe anxiety, depression, arthritis, chronic back pain, ETOH use, hx of cocaine use. Referred to [facility] ED from new PMD office for new onset AF. Now status post ELISE negative for ASHLY thrombus and uncomplicated DCCV with restoration of sinus rhythm.     Plan:   Observation on telemetry per post op protocol.    Resume PO intake.   Ambulate w/ assist once fully awake & back to baseline mental status w/ VSS.  Start Eliquis 5mg Q 12 hrs. Importance of strict compliance with anticoagulation regimen reinforced with pt.   Continue lopressor 25mg Q 8 hrs, transition to Toprol on discharge   Will need EP follow up as outpatient to discuss future rhythm control strategies. Pt doing well s/p uncomplicated ELISE & DCCV, 360J x 1 with restoration of sinus rhythm. Denies complaint.     PAST MEDICAL & SURGICAL HISTORY:  Afib  Alcohol abuse  Tobacco abuse  No significant past surgical history    MEDICATIONS  (STANDING):  chlordiazePOXIDE   Oral   chlordiazePOXIDE 50 milliGRAM(s) Oral every 8 hours  enoxaparin Injectable 100 milliGRAM(s) SubCutaneous every 12 hours  metoprolol tartrate 25 milliGRAM(s) Oral every 8 hours    MEDICATIONS  (PRN):  LORazepam   Injectable 2 milliGRAM(s) IV Push every 1 hour PRN CIWA-Ar score 8 or greater  metoprolol tartrate Injectable 5 milliGRAM(s) IV Push every 6 hours PRN tachycardia    Post-procedure VS: /79 HR 81 O2 sat 96% RR 16   awake, alert, no obvious distress  Skin: no erythema/edema/blistering at defib pad sites.   Card: S1/S2, RRR, no m/g/r  Resp: lungs CTA b/l  Abd: S/NT/ND  Ext: no edema, distal pulses intact    TTE: 3/10/22: Summary:   1. Left ventricular ejection fraction, by visual estimation, is 45 to 50%.   2.Mildly decreased global left ventricular systolic function.   3. Basal inferoseptal segment and basal inferior segment are abnormal as described above.   4. Moderately enlarged left atrium.   5. Moderate to severe right atrial enlargement.   6. Trace mitral valve regurgitation.   7. Mild aortic regurgitation.   8. Sclerotic aortic valve with normal opening.   9. There is no evidence of pericardial effusion.  10. There is dilation of the ascending aorta to 4.8 cm, or 2.2 cm/m2 when indexed to patient's BSA, measured 5.8 cm above the aortic valve.  11. No prior studies are available for comparison.  Kathi Steele M.D. Electronically signed on 3/11/2022 at 10:00:42 AM    EKG: sinus rhythm 80 bpm, PVC    Assessment:   61 year old male patient, current smoker, with a known history of severe anxiety, depression, arthritis, chronic back pain, ETOH use, hx of cocaine use. Referred to [facility] ED from new PMD office for new onset AF. Now status post ELISE negative for ASHLY thrombus and uncomplicated DCCV with restoration of sinus rhythm.     Plan:   Observation on telemetry per post op protocol.    Resume PO intake.   Ambulate w/ assist once fully awake & back to baseline mental status w/ VSS.  Start Eliquis 5mg Q 12 hrs. Importance of strict compliance with anticoagulation regimen reinforced with pt.   Discontinue lopressor, start Toprol 50mg daily.   Will need EP follow up as outpatient to discuss future rhythm control strategies.

## 2022-05-31 ENCOUNTER — EMERGENCY (EMERGENCY)
Facility: HOSPITAL | Age: 62
LOS: 1 days | Discharge: HOME | End: 2022-05-31
Attending: STUDENT IN AN ORGANIZED HEALTH CARE EDUCATION/TRAINING PROGRAM
Payer: SELF-PAY

## 2022-05-31 VITALS
SYSTOLIC BLOOD PRESSURE: 108 MMHG | OXYGEN SATURATION: 96 % | RESPIRATION RATE: 18 BRPM | DIASTOLIC BLOOD PRESSURE: 71 MMHG | TEMPERATURE: 98 F | WEIGHT: 225.09 LBS | HEIGHT: 70 IN | HEART RATE: 112 BPM

## 2022-05-31 PROBLEM — Z72.0 TOBACCO USE: Chronic | Status: ACTIVE | Noted: 2022-03-10

## 2022-05-31 PROBLEM — F10.10 ALCOHOL ABUSE, UNCOMPLICATED: Chronic | Status: ACTIVE | Noted: 2022-03-10

## 2022-05-31 PROBLEM — I48.91 UNSPECIFIED ATRIAL FIBRILLATION: Chronic | Status: ACTIVE | Noted: 2022-03-10

## 2022-05-31 PROCEDURE — 99285 EMERGENCY DEPT VISIT HI MDM: CPT

## 2022-05-31 PROCEDURE — 82962 GLUCOSE BLOOD TEST: CPT

## 2022-05-31 PROCEDURE — 99284 EMERGENCY DEPT VISIT MOD MDM: CPT

## 2022-05-31 RX ADMIN — Medication 50 MILLIGRAM(S): at 14:37

## 2022-05-31 NOTE — ED PROVIDER NOTE - PATIENT PORTAL LINK FT
You can access the FollowMyHealth Patient Portal offered by [facility] by registering at the following website: [email or web address]. By joining [facility]’s FollowMyHealth portal, you will also be able to view your health information using other applications (apps) compatible with our system.

## 2022-05-31 NOTE — ED PROVIDER NOTE - PROGRESS NOTE DETAILS
[employer] PGY-3: Pt reassessed, pt feeling better at this time, vss, pt able to walk, talk and vocalized plan of action. Discussed in depth and explained to pt in depth the next steps that need to be taking including proper follow up with PCP or specialists. All incidental findings were discussed with pt as well. Pt verbalized their concerns and all questions were answered. Pt understands dispo and wants discharge. Given good instructions when to return to ED and importance of f/u.

## 2022-05-31 NOTE — ED ADULT NURSE NOTE - OBJECTIVE STATEMENT
62M, nad, sleeping peacefully on stretcher in no acute distress with even and unlabored breathing. Pt arouses to
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
## 2022-05-31 NOTE — ED PROVIDER NOTE - CONDITION AT DISCHARGE:
Improved Bilobed Transposition Flap Text: The defect edges were debeveled with a #15 scalpel blade.  Given the location of the defect and the proximity to free margins a bilobed transposition flap was deemed most appropriate.  Using a sterile surgical marker, an appropriate bilobe flap drawn around the defect.    The area thus outlined was incised deep to adipose tissue with a #15 scalpel blade.  The skin margins were undermined to an appropriate distance in all directions utilizing iris scissors.

## 2022-05-31 NOTE — ED PROVIDER NOTE - PHYSICAL EXAMINATION
General: well appearing, interactive, NAD  HEENT: pupils equal and reactive, normal external ears bilaterally   Cardiac: RRR, no MRG appreciated  Resp: lungs clear to auscultation bilaterally, symmetric chest wall rise  Abd: soft, nontender, nondistended,   : no CVA tenderness  Neuro: Moving all extremities  Skin:  normal color for race Normal
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
